# Patient Record
(demographics unavailable — no encounter records)

---

## 2024-11-04 NOTE — ASSESSMENT
[FreeTextEntry1] : 68 Y OLD FEM WITH RESOLVING URI SX = OBSERVE HTN AND DYSLIPIDEMIA = LABS ,LOSARTAN 50 AND AMLODIPINE 10 MG ORDERED

## 2025-02-10 NOTE — PHYSICAL EXAM
[No Acute Distress] : no acute distress [Normal Sclera/Conjunctiva] : normal sclera/conjunctiva [Normal Outer Ear/Nose] : the outer ears and nose were normal in appearance [No JVD] : no jugular venous distention [Normal] : normal rate, regular rhythm, normal S1 and S2 and no murmur heard [No Edema] : there was no peripheral edema [Soft] : abdomen soft [Non Tender] : non-tender [Normal Bowel Sounds] : normal bowel sounds [Normal Anterior Cervical Nodes] : no anterior cervical lymphadenopathy [No CVA Tenderness] : no CVA  tenderness [Coordination Grossly Intact] : coordination grossly intact [No Focal Deficits] : no focal deficits [Alert and Oriented x3] : oriented to person, place, and time

## 2025-02-10 NOTE — ASSESSMENT
[FreeTextEntry1] : 69 Y OLD FME WITH PMX OF HTN = AND DYSLIPIDEMIA = LABS ,AMLODIPINE AND LOSARTAN ORDERED GI FOR COLON CANCER SCREENING  MAMMO